# Patient Record
Sex: FEMALE | Race: WHITE | NOT HISPANIC OR LATINO | URBAN - METROPOLITAN AREA
[De-identification: names, ages, dates, MRNs, and addresses within clinical notes are randomized per-mention and may not be internally consistent; named-entity substitution may affect disease eponyms.]

---

## 2023-12-18 ENCOUNTER — TELEPHONE ENCOUNTER (OUTPATIENT)
Dept: URBAN - METROPOLITAN AREA CLINIC 68 | Facility: CLINIC | Age: 63
End: 2023-12-18

## 2023-12-19 ENCOUNTER — CLAIMS CREATED FROM THE CLAIM WINDOW (OUTPATIENT)
Dept: URBAN - METROPOLITAN AREA CLINIC 68 | Facility: CLINIC | Age: 63
End: 2023-12-19
Payer: COMMERCIAL

## 2023-12-19 ENCOUNTER — LAB OUTSIDE AN ENCOUNTER (OUTPATIENT)
Dept: URBAN - METROPOLITAN AREA CLINIC 68 | Facility: CLINIC | Age: 63
End: 2023-12-19

## 2023-12-19 VITALS
SYSTOLIC BLOOD PRESSURE: 122 MMHG | DIASTOLIC BLOOD PRESSURE: 80 MMHG | BODY MASS INDEX: 19.07 KG/M2 | HEIGHT: 61 IN | TEMPERATURE: 97.7 F | WEIGHT: 101 LBS | HEART RATE: 78 BPM | OXYGEN SATURATION: 98 %

## 2023-12-19 DIAGNOSIS — K59.09 CHRONIC CONSTIPATION: ICD-10-CM

## 2023-12-19 DIAGNOSIS — Z12.11 COLON CANCER SCREENING: ICD-10-CM

## 2023-12-19 DIAGNOSIS — R10.11 RUQ PAIN: ICD-10-CM

## 2023-12-19 DIAGNOSIS — R11.0 NAUSEA: ICD-10-CM

## 2023-12-19 PROBLEM — 305058001: Status: ACTIVE | Noted: 2023-12-19

## 2023-12-19 PROCEDURE — 99204 OFFICE O/P NEW MOD 45 MIN: CPT

## 2023-12-19 RX ORDER — PANTOPRAZOLE SODIUM 40 MG/1
1 TABLET 30 MINUTES BEFORE MORNING MEAL TABLET, DELAYED RELEASE ORAL ONCE A DAY
Qty: 30 | Refills: 3 | OUTPATIENT
Start: 2023-12-19

## 2023-12-19 RX ORDER — VALACYCLOVIR 500 MG/1
TAKE ONE TABLET BY MOUTH ONE TIME DAILY, MAY INCREASE TO TWO TIMES A DAY FOR 3 DAYS AS NEEDED FOR OUTBREAK TABLET ORAL
Qty: 90 UNSPECIFIED | Refills: 1 | Status: ON HOLD | COMMUNITY

## 2023-12-19 NOTE — HPI-TODAY'S VISIT:
Patient presents for evaluation of RUQ pain s/p US with [facility]. Refers intermittent RUQ pain,especially with greasy meals, Refers occasional nausea, which is relieved with gavsicon. Refers occasional bloating, controlled phazyme and OTC pancreatic enzymes. Refers did start OTC omeprazole 3 days ago with some improvement. Refers last colonoscopy >10 yrs. Refers hx constipation, well managed w mag citrate 150mg. Denies FMHx colon cancer or polyps. Denies vomiting, dysphagia, odynophagia, heartburn, diarrhea, hematochezia, melena, or unintentional weight loss.

## 2023-12-21 ENCOUNTER — CLAIMS CREATED FROM THE CLAIM WINDOW (OUTPATIENT)
Dept: URBAN - METROPOLITAN AREA CLINIC 4 | Facility: CLINIC | Age: 63
End: 2023-12-21
Payer: COMMERCIAL

## 2023-12-21 ENCOUNTER — CLAIMS CREATED FROM THE CLAIM WINDOW (OUTPATIENT)
Dept: URBAN - METROPOLITAN AREA SURGERY CENTER 12 | Facility: SURGERY CENTER | Age: 63
End: 2023-12-21
Payer: COMMERCIAL

## 2023-12-21 DIAGNOSIS — K22.89 OTHER SPECIFIED DISEASE OF ESOPHAGUS: ICD-10-CM

## 2023-12-21 DIAGNOSIS — K31.89 OTHER DISEASES OF STOMACH AND DUODENUM: ICD-10-CM

## 2023-12-21 DIAGNOSIS — K29.70 GASTRITIS WITHOUT BLEEDING, UNSPECIFIED CHRONICITY, UNSPECIFIED GASTRITIS TYPE: ICD-10-CM

## 2023-12-21 DIAGNOSIS — K29.70 GASTRITIS: ICD-10-CM

## 2023-12-21 DIAGNOSIS — R10.11 RUQ ABDOMINAL PAIN: ICD-10-CM

## 2023-12-21 DIAGNOSIS — K22.9 IRREGULAR Z LINE OF ESOPHAGUS: ICD-10-CM

## 2023-12-21 PROCEDURE — 43239 EGD BIOPSY SINGLE/MULTIPLE: CPT | Performed by: INTERNAL MEDICINE

## 2023-12-21 PROCEDURE — 88312 SPECIAL STAINS GROUP 1: CPT | Performed by: PATHOLOGY

## 2023-12-21 PROCEDURE — 88305 TISSUE EXAM BY PATHOLOGIST: CPT | Performed by: PATHOLOGY

## 2023-12-21 PROCEDURE — 00731 ANES UPR GI NDSC PX NOS: CPT | Performed by: NURSE ANESTHETIST, CERTIFIED REGISTERED

## 2023-12-21 PROCEDURE — 88313 SPECIAL STAINS GROUP 2: CPT | Performed by: PATHOLOGY

## 2023-12-21 RX ORDER — PANTOPRAZOLE SODIUM 40 MG/1
1 TABLET 30 MINUTES BEFORE MORNING MEAL TABLET, DELAYED RELEASE ORAL ONCE A DAY
Qty: 30 | Refills: 3 | Status: ACTIVE | COMMUNITY
Start: 2023-12-19

## 2023-12-21 RX ORDER — VALACYCLOVIR 500 MG/1
TAKE ONE TABLET BY MOUTH ONE TIME DAILY, MAY INCREASE TO TWO TIMES A DAY FOR 3 DAYS AS NEEDED FOR OUTBREAK TABLET ORAL
Qty: 90 UNSPECIFIED | Refills: 1 | Status: ON HOLD | COMMUNITY

## 2024-01-05 ENCOUNTER — LAB OUTSIDE AN ENCOUNTER (OUTPATIENT)
Dept: URBAN - METROPOLITAN AREA CLINIC 68 | Facility: CLINIC | Age: 64
End: 2024-01-05

## 2024-01-05 ENCOUNTER — OFFICE VISIT (OUTPATIENT)
Dept: URBAN - METROPOLITAN AREA CLINIC 68 | Facility: CLINIC | Age: 64
End: 2024-01-05
Payer: COMMERCIAL

## 2024-01-05 VITALS
HEART RATE: 76 BPM | TEMPERATURE: 97.7 F | DIASTOLIC BLOOD PRESSURE: 84 MMHG | WEIGHT: 101 LBS | HEIGHT: 61 IN | SYSTOLIC BLOOD PRESSURE: 122 MMHG | BODY MASS INDEX: 19.07 KG/M2

## 2024-01-05 DIAGNOSIS — K29.30 CHRONIC SUPERFICIAL GASTRITIS WITHOUT BLEEDING: ICD-10-CM

## 2024-01-05 DIAGNOSIS — R10.11 RUQ PAIN: ICD-10-CM

## 2024-01-05 DIAGNOSIS — R63.4 WEIGHT LOSS: ICD-10-CM

## 2024-01-05 DIAGNOSIS — R11.0 NAUSEA: ICD-10-CM

## 2024-01-05 PROBLEM — 89362005: Status: ACTIVE | Noted: 2024-01-05

## 2024-01-05 PROCEDURE — 99214 OFFICE O/P EST MOD 30 MIN: CPT

## 2024-01-05 RX ORDER — PANTOPRAZOLE SODIUM 40 MG/1
1 TABLET 30 MINUTES BEFORE MORNING MEAL TABLET, DELAYED RELEASE ORAL ONCE A DAY
Qty: 30 | Refills: 3 | Status: ACTIVE | COMMUNITY
Start: 2023-12-19

## 2024-01-05 RX ORDER — VALACYCLOVIR 500 MG/1
TAKE ONE TABLET BY MOUTH ONE TIME DAILY, MAY INCREASE TO TWO TIMES A DAY FOR 3 DAYS AS NEEDED FOR OUTBREAK TABLET ORAL
Qty: 90 UNSPECIFIED | Refills: 1 | Status: ON HOLD | COMMUNITY

## 2024-01-05 NOTE — PHYSICAL EXAM GASTROINTESTINAL
Abdomen , soft, mildly tender to deep palpation of RUQ (otherwise unremarkable), nondistended , no guarding or rigidity , no masses palpable , normal bowel sounds , Liver and Spleen , no hepatomegaly present , no hepatosplenomegaly , liver nontender , spleen not palpable

## 2024-01-05 NOTE — HPI-TODAY'S VISIT:
Patient presents for follow up of RUQ pain s/p recent EGD with Dr. Marshall (12/2123). Refers she has been taking PPI (pantoprazole 40mg qd) with some improvement of pain, but no resolution. Refers occasional radiation to back and some occasional nausea, especially when lying down or with greasy meals. Refers was previously recommended for colonoscopy (as she has not had one in >10yrs), but did initially defer. She has considered further at home and is amenable to scheduling a colonoscopy pending improvement up RUQ pain. Refers some unintentional weight loss over the past year of approx 8+ lbs despite no changes to diet/intake or exercise.  Admits constipation (well managed on mag citrate), but o/w denies fever, chills, vomiting, dysphagia, odynophagia, heartburn, diarrhea, hematochezia, or melena.

## 2024-01-22 ENCOUNTER — TELEPHONE ENCOUNTER (OUTPATIENT)
Dept: URBAN - METROPOLITAN AREA CLINIC 68 | Facility: CLINIC | Age: 64
End: 2024-01-22

## 2024-01-26 ENCOUNTER — OFFICE VISIT (OUTPATIENT)
Dept: URBAN - METROPOLITAN AREA CLINIC 68 | Facility: CLINIC | Age: 64
End: 2024-01-26
Payer: COMMERCIAL

## 2024-01-26 ENCOUNTER — LAB OUTSIDE AN ENCOUNTER (OUTPATIENT)
Dept: URBAN - METROPOLITAN AREA CLINIC 68 | Facility: CLINIC | Age: 64
End: 2024-01-26

## 2024-01-26 VITALS
WEIGHT: 101 LBS | BODY MASS INDEX: 19.07 KG/M2 | OXYGEN SATURATION: 98 % | TEMPERATURE: 97.8 F | HEIGHT: 61 IN | SYSTOLIC BLOOD PRESSURE: 122 MMHG | DIASTOLIC BLOOD PRESSURE: 86 MMHG | HEART RATE: 66 BPM

## 2024-01-26 DIAGNOSIS — K83.8 DILATED INTRAHEPATIC BILE DUCT: ICD-10-CM

## 2024-01-26 DIAGNOSIS — R91.8 LUNG MASS: ICD-10-CM

## 2024-01-26 DIAGNOSIS — R93.89 ABNORMAL CT SCAN: ICD-10-CM

## 2024-01-26 DIAGNOSIS — R10.11 RUQ PAIN: ICD-10-CM

## 2024-01-26 PROCEDURE — 99214 OFFICE O/P EST MOD 30 MIN: CPT

## 2024-01-26 RX ORDER — VALACYCLOVIR 500 MG/1
TAKE ONE TABLET BY MOUTH ONE TIME DAILY, MAY INCREASE TO TWO TIMES A DAY FOR 3 DAYS AS NEEDED FOR OUTBREAK TABLET ORAL
Qty: 90 UNSPECIFIED | Refills: 1 | COMMUNITY

## 2024-01-26 RX ORDER — PANTOPRAZOLE SODIUM 40 MG/1
1 TABLET 30 MINUTES BEFORE MORNING MEAL TABLET, DELAYED RELEASE ORAL ONCE A DAY
Qty: 30 | Refills: 3 | COMMUNITY
Start: 2023-12-19

## 2024-01-26 NOTE — HPI-TODAY'S VISIT:
Patient presents for follow up of RUQ pain s/p recent HIDA scan (1/22/24). Refers she previoulsy underwernt EGD with Dr. Marshall (12/2123) and has been taking PPI BID with significant improvement of nausea dn reflux-like symptoms. Refers did complete recent HIDA and CT A/P. Refers was previously recommended for colonoscopy (as she has not had one in >10yrs), but did initially defer. She has considered further at home and is amenable to scheduling a colonoscopy pending improvement up RUQ pain. Refers some unintentional weight loss over the past year of approx 8+ lbs despite no changes to diet/intake or exercise.  Admits constipation (well managed on mag citrate), but o/w denies fever, chills, vomiting, dysphagia, odynophagia, heartburn, diarrhea, hematochezia, or melena.

## 2024-01-30 PROBLEM — 309529002: Status: ACTIVE | Noted: 2024-01-30

## 2024-01-30 PROBLEM — 301717006: Status: ACTIVE | Noted: 2023-12-19

## 2024-01-30 PROBLEM — 123608004: Status: ACTIVE | Noted: 2024-01-26

## 2024-01-30 PROBLEM — 289999003: Status: ACTIVE | Noted: 2024-01-30

## 2024-01-30 PROBLEM — 422587007: Status: ACTIVE | Noted: 2023-12-19

## 2024-01-31 ENCOUNTER — TELEPHONE ENCOUNTER (OUTPATIENT)
Dept: URBAN - METROPOLITAN AREA CLINIC 68 | Facility: CLINIC | Age: 64
End: 2024-01-31

## 2024-01-31 ENCOUNTER — OFFICE VISIT (OUTPATIENT)
Dept: URBAN - METROPOLITAN AREA CLINIC 68 | Facility: CLINIC | Age: 64
End: 2024-01-31

## 2024-02-02 LAB
A/G RATIO: 1.5
ABSOLUTE BASOPHILS: 62
ABSOLUTE EOSINOPHILS: 62
ABSOLUTE LYMPHOCYTES: 2317
ABSOLUTE MONOCYTES: 663
ABSOLUTE NEUTROPHILS: 4696
ALBUMIN: 4.5
ALKALINE PHOSPHATASE: 64
ALT (SGPT): 13
AST (SGOT): 22
BASOPHILS: 0.8
BILIRUBIN, TOTAL: 0.4
BUN/CREATININE RATIO: (no result)
BUN: 15
CA 19-9: 6
CALCIUM: 9.5
CARBON DIOXIDE, TOTAL: 26
CHLORIDE: 96
CREATININE: 0.74
EGFR: 90
EOSINOPHILS: 0.8
GLOBULIN, TOTAL: 3
GLUCOSE: 91
HEMATOCRIT: 38.4
HEMOGLOBIN: 13.1
INR: 1
LYMPHOCYTES: 29.7
MCH: 32.3
MCHC: 34.1
MCV: 94.6
MONOCYTES: 8.5
MPV: 9.7
NEUTROPHILS: 60.2
PLATELET COUNT: 241
POTASSIUM: 4.4
PROTEIN, TOTAL: 7.5
PT: 10.7
RDW: 11.5
RED BLOOD CELL COUNT: 4.06
SODIUM: 131
WHITE BLOOD CELL COUNT: 7.8

## 2024-02-09 ENCOUNTER — OV EP (OUTPATIENT)
Dept: URBAN - METROPOLITAN AREA CLINIC 68 | Facility: CLINIC | Age: 64
End: 2024-02-09

## 2024-02-09 RX ORDER — VALACYCLOVIR 500 MG/1
TAKE ONE TABLET BY MOUTH ONE TIME DAILY, MAY INCREASE TO TWO TIMES A DAY FOR 3 DAYS AS NEEDED FOR OUTBREAK TABLET ORAL
Qty: 90 UNSPECIFIED | Refills: 1 | COMMUNITY

## 2024-02-09 RX ORDER — PANTOPRAZOLE SODIUM 40 MG/1
1 TABLET 30 MINUTES BEFORE MORNING MEAL TABLET, DELAYED RELEASE ORAL ONCE A DAY
Qty: 30 | Refills: 3 | COMMUNITY
Start: 2023-12-19

## 2024-02-12 ENCOUNTER — OV EP (OUTPATIENT)
Dept: URBAN - METROPOLITAN AREA CLINIC 68 | Facility: CLINIC | Age: 64
End: 2024-02-12

## 2024-09-20 ENCOUNTER — DASHBOARD ENCOUNTERS (OUTPATIENT)
Age: 64
End: 2024-09-20

## 2024-09-20 ENCOUNTER — OFFICE VISIT (OUTPATIENT)
Dept: URBAN - METROPOLITAN AREA CLINIC 68 | Facility: CLINIC | Age: 64
End: 2024-09-20
Payer: MEDICARE

## 2024-09-20 VITALS
WEIGHT: 103 LBS | TEMPERATURE: 97.7 F | HEIGHT: 61 IN | BODY MASS INDEX: 19.45 KG/M2 | SYSTOLIC BLOOD PRESSURE: 116 MMHG | DIASTOLIC BLOOD PRESSURE: 78 MMHG | HEART RATE: 67 BPM | OXYGEN SATURATION: 98 %

## 2024-09-20 DIAGNOSIS — R93.89 ABNORMAL CT SCAN: ICD-10-CM

## 2024-09-20 DIAGNOSIS — Z12.11 COLON CANCER SCREENING: ICD-10-CM

## 2024-09-20 DIAGNOSIS — R10.13 DYSPEPSIA: ICD-10-CM

## 2024-09-20 DIAGNOSIS — R10.11 RUQ PAIN: ICD-10-CM

## 2024-09-20 PROBLEM — 162031009: Status: ACTIVE | Noted: 2024-09-20

## 2024-09-20 PROCEDURE — 99214 OFFICE O/P EST MOD 30 MIN: CPT

## 2024-09-20 RX ORDER — VALACYCLOVIR 500 MG/1
TAKE ONE TABLET BY MOUTH ONE TIME DAILY, MAY INCREASE TO TWO TIMES A DAY FOR 3 DAYS AS NEEDED FOR OUTBREAK TABLET ORAL
Qty: 90 UNSPECIFIED | Refills: 1 | Status: ACTIVE | COMMUNITY

## 2024-09-20 RX ORDER — OMEPRAZOLE 40 MG/1
1 CAPSULE 30 MINUTES BEFORE MORNING MEAL CAPSULE, DELAYED RELEASE ORAL ONCE A DAY
Qty: 90 | Refills: 0 | OUTPATIENT
Start: 2024-09-20

## 2024-09-20 RX ORDER — PANTOPRAZOLE SODIUM 40 MG/1
1 TABLET 30 MINUTES BEFORE MORNING MEAL TABLET, DELAYED RELEASE ORAL ONCE A DAY
Qty: 30 | Refills: 3 | Status: ON HOLD | COMMUNITY
Start: 2023-12-19

## 2024-09-20 RX ORDER — ALPRAZOLAM 0.25 MG/1
1 TABLET TABLET ORAL TWICE A DAY
Status: ACTIVE | COMMUNITY

## 2024-09-20 RX ORDER — TRAMADOL HYDROCHLORIDE 100 MG/1
1 TABLET TABLET, EXTENDED RELEASE ORAL ONCE A DAY
Status: ACTIVE | COMMUNITY

## 2024-10-21 ENCOUNTER — OFFICE VISIT (OUTPATIENT)
Dept: URBAN - METROPOLITAN AREA CLINIC 68 | Facility: CLINIC | Age: 64
End: 2024-10-21
Payer: MEDICARE

## 2024-10-21 VITALS
HEART RATE: 77 BPM | BODY MASS INDEX: 20.28 KG/M2 | WEIGHT: 107.4 LBS | DIASTOLIC BLOOD PRESSURE: 80 MMHG | SYSTOLIC BLOOD PRESSURE: 140 MMHG | HEIGHT: 61 IN | OXYGEN SATURATION: 97 %

## 2024-10-21 DIAGNOSIS — Z12.11 COLON CANCER SCREENING: ICD-10-CM

## 2024-10-21 DIAGNOSIS — K59.09 CHRONIC CONSTIPATION: ICD-10-CM

## 2024-10-21 DIAGNOSIS — R10.13 DYSPEPSIA: ICD-10-CM

## 2024-10-21 DIAGNOSIS — K29.30 CHRONIC SUPERFICIAL GASTRITIS WITHOUT BLEEDING: ICD-10-CM

## 2024-10-21 PROCEDURE — 99214 OFFICE O/P EST MOD 30 MIN: CPT

## 2024-10-21 RX ORDER — TRAMADOL HYDROCHLORIDE 100 MG/1
1 TABLET TABLET, EXTENDED RELEASE ORAL ONCE A DAY
Status: ACTIVE | COMMUNITY

## 2024-10-21 RX ORDER — PANTOPRAZOLE SODIUM 40 MG/1
1 TABLET 30 MINUTES BEFORE MORNING MEAL TABLET, DELAYED RELEASE ORAL ONCE A DAY
Qty: 30 | Refills: 3 | Status: ON HOLD | COMMUNITY
Start: 2023-12-19

## 2024-10-21 RX ORDER — VALACYCLOVIR 500 MG/1
TAKE ONE TABLET BY MOUTH ONE TIME DAILY, MAY INCREASE TO TWO TIMES A DAY FOR 3 DAYS AS NEEDED FOR OUTBREAK TABLET ORAL
Qty: 90 UNSPECIFIED | Refills: 1 | Status: ACTIVE | COMMUNITY

## 2024-10-21 RX ORDER — ALPRAZOLAM 0.25 MG/1
1 TABLET TABLET ORAL TWICE A DAY
Status: ACTIVE | COMMUNITY

## 2024-10-21 RX ORDER — OMEPRAZOLE 40 MG/1
1 CAPSULE 30 MINUTES BEFORE MORNING MEAL CAPSULE, DELAYED RELEASE ORAL ONCE A DAY
Qty: 90 | Refills: 0 | Status: ACTIVE | COMMUNITY
Start: 2024-09-20

## 2024-10-21 NOTE — HPI-TODAY'S VISIT:
Patient presents for follow-up due to intermittent epigastric pain. Refers Omeprazole 40mg/d with significant relief of symptoms. Refers chronic constipation well managed on mag citrate. Refers last colonoscopy over 10 years ago. Denies nausea/vomiting, dysphagia, odynophagia, heartburn, melena, rectal bleeding, diarrhea, constipation, weight loss, fever.